# Patient Record
Sex: FEMALE | Race: WHITE | NOT HISPANIC OR LATINO | URBAN - METROPOLITAN AREA
[De-identification: names, ages, dates, MRNs, and addresses within clinical notes are randomized per-mention and may not be internally consistent; named-entity substitution may affect disease eponyms.]

---

## 2018-06-28 ENCOUNTER — TELEPHONE (OUTPATIENT)
Dept: OBSTETRICS AND GYNECOLOGY | Age: 53
End: 2018-06-28

## 2018-10-04 ENCOUNTER — OFFICE VISIT (OUTPATIENT)
Dept: OBSTETRICS AND GYNECOLOGY | Age: 53
End: 2018-10-04

## 2018-10-04 VITALS
BODY MASS INDEX: 23.7 KG/M2 | DIASTOLIC BLOOD PRESSURE: 82 MMHG | WEIGHT: 151 LBS | HEIGHT: 67 IN | SYSTOLIC BLOOD PRESSURE: 122 MMHG

## 2018-10-04 DIAGNOSIS — Z01.419 WELL WOMAN EXAM WITH ROUTINE GYNECOLOGICAL EXAM: Primary | ICD-10-CM

## 2018-10-04 DIAGNOSIS — Z00.00 ROUTINE GENERAL MEDICAL EXAMINATION AT A HEALTH CARE FACILITY: ICD-10-CM

## 2018-10-04 DIAGNOSIS — Z11.51 ENCOUNTER FOR SCREENING FOR HUMAN PAPILLOMAVIRUS (HPV): ICD-10-CM

## 2018-10-04 DIAGNOSIS — R87.619 ABNORMAL CYTOLOGICAL FINDING IN SPECIMEN FROM CERVIX UTERI: ICD-10-CM

## 2018-10-04 LAB
ALBUMIN SERPL-MCNC: 4.6 G/DL (ref 3.5–5.2)
ALBUMIN/GLOB SERPL: 1.8 G/DL
ALP SERPL-CCNC: 52 U/L (ref 39–117)
ALT SERPL-CCNC: 16 U/L (ref 1–33)
AST SERPL-CCNC: 18 U/L (ref 1–32)
BASOPHILS # BLD AUTO: 0.01 10*3/MM3 (ref 0–0.2)
BASOPHILS NFR BLD AUTO: 0.3 % (ref 0–1.5)
BILIRUB SERPL-MCNC: 0.5 MG/DL (ref 0.1–1.2)
BUN SERPL-MCNC: 16 MG/DL (ref 6–20)
BUN/CREAT SERPL: 21.1 (ref 7–25)
CALCIUM SERPL-MCNC: 9.8 MG/DL (ref 8.6–10.5)
CHLORIDE SERPL-SCNC: 100 MMOL/L (ref 98–107)
CHOLEST SERPL-MCNC: 234 MG/DL (ref 0–200)
CO2 SERPL-SCNC: 26.3 MMOL/L (ref 22–29)
CREAT SERPL-MCNC: 0.76 MG/DL (ref 0.57–1)
EOSINOPHIL # BLD AUTO: 0.11 10*3/MM3 (ref 0–0.7)
EOSINOPHIL NFR BLD AUTO: 3.7 % (ref 0.3–6.2)
ERYTHROCYTE [DISTWIDTH] IN BLOOD BY AUTOMATED COUNT: 12.8 % (ref 11.7–13)
GLOBULIN SER CALC-MCNC: 2.6 GM/DL
GLUCOSE SERPL-MCNC: 77 MG/DL (ref 65–99)
HCT VFR BLD AUTO: 43.6 % (ref 35.6–45.5)
HDLC SERPL-MCNC: 79 MG/DL (ref 40–60)
HGB BLD-MCNC: 14.3 G/DL (ref 11.9–15.5)
IMM GRANULOCYTES # BLD: 0 10*3/MM3 (ref 0–0.03)
IMM GRANULOCYTES NFR BLD: 0 % (ref 0–0.5)
LDLC SERPL CALC-MCNC: 132 MG/DL (ref 0–100)
LYMPHOCYTES # BLD AUTO: 1.12 10*3/MM3 (ref 0.9–4.8)
LYMPHOCYTES NFR BLD AUTO: 37.6 % (ref 19.6–45.3)
MCH RBC QN AUTO: 29.5 PG (ref 26.9–32)
MCHC RBC AUTO-ENTMCNC: 32.8 G/DL (ref 32.4–36.3)
MCV RBC AUTO: 90.1 FL (ref 80.5–98.2)
MONOCYTES # BLD AUTO: 0.48 10*3/MM3 (ref 0.2–1.2)
MONOCYTES NFR BLD AUTO: 16.1 % (ref 5–12)
NEUTROPHILS # BLD AUTO: 1.26 10*3/MM3 (ref 1.9–8.1)
NEUTROPHILS NFR BLD AUTO: 42.3 % (ref 42.7–76)
PLATELET # BLD AUTO: 205 10*3/MM3 (ref 140–500)
POTASSIUM SERPL-SCNC: 4.5 MMOL/L (ref 3.5–5.2)
PROT SERPL-MCNC: 7.2 G/DL (ref 6–8.5)
RBC # BLD AUTO: 4.84 10*6/MM3 (ref 3.9–5.2)
SODIUM SERPL-SCNC: 140 MMOL/L (ref 136–145)
TRIGL SERPL-MCNC: 116 MG/DL (ref 0–150)
TSH SERPL DL<=0.005 MIU/L-ACNC: 1.61 MIU/ML (ref 0.27–4.2)
VLDLC SERPL CALC-MCNC: 23.2 MG/DL (ref 5–40)
WBC # BLD AUTO: 2.98 10*3/MM3 (ref 4.5–10.7)

## 2018-10-04 PROCEDURE — G0101 CA SCREEN;PELVIC/BREAST EXAM: HCPCS | Performed by: OBSTETRICS & GYNECOLOGY

## 2018-10-04 RX ORDER — FEXOFENADINE HCL 180 MG/1
180 TABLET ORAL
COMMUNITY
End: 2021-10-12

## 2018-10-04 RX ORDER — PREDNISONE 1 MG/1
10 TABLET ORAL
COMMUNITY
End: 2020-03-02

## 2018-10-04 RX ORDER — SERTRALINE HYDROCHLORIDE 100 MG/1
100 TABLET, FILM COATED ORAL DAILY
Refills: 11 | COMMUNITY
Start: 2018-08-07 | End: 2020-03-02

## 2018-10-04 RX ORDER — FLUTICASONE PROPIONATE 50 MCG
1 SPRAY, SUSPENSION (ML) NASAL
COMMUNITY
Start: 2016-12-08

## 2018-10-04 RX ORDER — CYCLOSPORINE 0.5 MG/ML
EMULSION OPHTHALMIC
COMMUNITY
End: 2022-10-18

## 2018-10-04 RX ORDER — CELECOXIB 200 MG/1
CAPSULE ORAL
COMMUNITY
Start: 2017-01-13 | End: 2021-10-12

## 2018-10-04 RX ORDER — OMEPRAZOLE 20 MG/1
20 CAPSULE, DELAYED RELEASE ORAL
COMMUNITY

## 2018-10-04 RX ORDER — AZELASTINE HYDROCHLORIDE 0.5 MG/ML
SOLUTION/ DROPS OPHTHALMIC
Refills: 2 | COMMUNITY
Start: 2018-09-17 | End: 2021-10-12

## 2018-10-04 RX ORDER — ALBUTEROL SULFATE 90 UG/1
2 AEROSOL, METERED RESPIRATORY (INHALATION)
COMMUNITY
End: 2021-10-12

## 2018-10-04 NOTE — PROGRESS NOTES
Chief complaint: annual    Subjective   History of Present Illness    Viviane Pompa is a 53 y.o.  who presents for New Gyn/annual exam.  Menses are absent. Menopause age 50. Hot flashes have resolved. Declines hormones. H/o cryo in 20's.     Obstetric History:  OB History      Para Term  AB Living    1       1      SAB TAB Ectopic Molar Multiple Live Births    1                   Menstrual History:     No LMP recorded (lmp unknown).         Current contraception: post menopausal status  History of abnormal Pap smear: yes -  in 20's  Received Gardasil immunization: no  Perform regular self breast exam: yes -    Family history of uterine or ovarian cancer: no  Family History of colon cancer: no  Family history of breast cancer: yes - MGM dx age 48    Mammogram: ordered.  Colonoscopy: recommended.  DEXA: not indicated.    Exercise: moderately active  Calcium/Vitamin D: adequate intake    The following portions of the patient's history were reviewed and updated as appropriate: allergies, current medications, past family history, past medical history, past social history, past surgical history and problem list.    Review of Systems   Constitutional: Negative for activity change, fatigue, fever and unexpected weight change.   Respiratory: Negative for chest tightness and shortness of breath.    Cardiovascular: Negative for chest pain, palpitations and leg swelling.   Gastrointestinal: Negative for abdominal distention, abdominal pain, blood in stool, constipation, diarrhea, nausea and vomiting.   Endocrine: Negative for cold intolerance, heat intolerance, polydipsia, polyphagia and polyuria.   Genitourinary: Negative.    Musculoskeletal: Positive for arthralgias and joint swelling. Negative for back pain.   Skin: Negative for color change.   Neurological: Negative for weakness and headaches.   Hematological: Does not bruise/bleed easily.   Psychiatric/Behavioral: Negative for confusion.  "      Pertinent items are noted in HPI.     Objective   Physical Exam    /82   Ht 170.2 cm (67\")   Wt 68.5 kg (151 lb)   LMP  (LMP Unknown)   BMI 23.65 kg/m²     General:   alert, appears stated age and cooperative   Neck: no asymmetry, masses, or scars   Heart: regular rate and rhythm, S1, S2 normal, no murmur, click, rub or gallop   Lungs: clear to auscultation bilaterally   Abdomen: soft, non-tender, without masses or organomegaly   Breast: inspection negative, no nipple discharge or bleeding, no masses or nodularity palpable, bilateral implants   Vulva: normal, Bartholin's, Urethra, [place]'s normal   Vagina: normal mucosa   Cervix: no bleeding following Pap, no cervical motion tenderness, no lesions and nulliparous appearance   Uterus: normal size, anteverted, mobile, non-tender, normal shape and consistency   Adnexa: normal adnexa and no mass, fullness, tenderness   Rectal: not indicated     Assessment/Plan   Viviane was seen today for gynecologic exam.    Diagnoses and all orders for this visit:    Well woman exam with routine gynecological exam  -     PapIG, HPV, Rfx 16 / 18    Encounter for screening for human papillomavirus (HPV)  -     PapIG, HPV, Rfx 16 / 18    Routine general medical examination at a health care facility  -     CBC & Differential  -     Comprehensive Metabolic Panel  -     Lipid Panel  -     TSH    Abnormal cytological finding in specimen from cervix uteri   -     PapIG, HPV, Rfx 16 / 18    pt desires hereditary cancer gene panel w/ [facility], info given (MGM w/ breast CA age 47 and personal h/o colon cancer age 40)    Breast self exam technique reviewed and patient encouraged to perform self-exam monthly.  Discussed healthy lifestyle modifications.  Pap smear sent                 "

## 2018-10-09 LAB
CYTOLOGIST CVX/VAG CYTO: NORMAL
CYTOLOGY CVX/VAG DOC THIN PREP: NORMAL
DX ICD CODE: NORMAL
HIV 1 & 2 AB SER-IMP: NORMAL
HPV I/H RISK 1 DNA CVX QL PROBE+SIG AMP: NEGATIVE
Lab: NORMAL
OTHER STN SPEC: NORMAL
PATH REPORT.FINAL DX SPEC: NORMAL
STAT OF ADQ CVX/VAG CYTO-IMP: NORMAL

## 2018-10-10 ENCOUNTER — TELEPHONE (OUTPATIENT)
Dept: OBSTETRICS AND GYNECOLOGY | Age: 53
End: 2018-10-10

## 2018-12-26 ENCOUNTER — PROCEDURE VISIT (OUTPATIENT)
Dept: OBSTETRICS AND GYNECOLOGY | Age: 53
End: 2018-12-26

## 2018-12-26 ENCOUNTER — APPOINTMENT (OUTPATIENT)
Dept: WOMENS IMAGING | Facility: HOSPITAL | Age: 53
End: 2018-12-26

## 2018-12-26 DIAGNOSIS — Z12.31 VISIT FOR SCREENING MAMMOGRAM: Primary | ICD-10-CM

## 2018-12-26 PROCEDURE — 77067 SCR MAMMO BI INCL CAD: CPT | Performed by: RADIOLOGY

## 2018-12-26 PROCEDURE — 77067 SCR MAMMO BI INCL CAD: CPT | Performed by: OBSTETRICS & GYNECOLOGY

## 2020-03-02 ENCOUNTER — PROCEDURE VISIT (OUTPATIENT)
Dept: OBSTETRICS AND GYNECOLOGY | Age: 55
End: 2020-03-02

## 2020-03-02 ENCOUNTER — APPOINTMENT (OUTPATIENT)
Dept: WOMENS IMAGING | Facility: HOSPITAL | Age: 55
End: 2020-03-02

## 2020-03-02 ENCOUNTER — OFFICE VISIT (OUTPATIENT)
Dept: OBSTETRICS AND GYNECOLOGY | Age: 55
End: 2020-03-02

## 2020-03-02 VITALS
WEIGHT: 149.2 LBS | HEIGHT: 67 IN | BODY MASS INDEX: 23.42 KG/M2 | SYSTOLIC BLOOD PRESSURE: 108 MMHG | DIASTOLIC BLOOD PRESSURE: 64 MMHG

## 2020-03-02 DIAGNOSIS — N95.1 HOT FLASHES DUE TO MENOPAUSE: ICD-10-CM

## 2020-03-02 DIAGNOSIS — R53.82 CHRONIC FATIGUE: ICD-10-CM

## 2020-03-02 DIAGNOSIS — R10.2 PELVIC PAIN: ICD-10-CM

## 2020-03-02 DIAGNOSIS — Z01.419 WELL WOMAN EXAM WITH ROUTINE GYNECOLOGICAL EXAM: Primary | ICD-10-CM

## 2020-03-02 DIAGNOSIS — Z12.31 VISIT FOR SCREENING MAMMOGRAM: Primary | ICD-10-CM

## 2020-03-02 PROBLEM — Z85.038 HISTORY OF COLON CANCER: Status: ACTIVE | Noted: 2020-03-02

## 2020-03-02 PROBLEM — Z79.52 LONG TERM SYSTEMIC STEROID USER: Status: ACTIVE | Noted: 2020-03-02

## 2020-03-02 PROBLEM — Z13.71 BRCA NEGATIVE: Status: ACTIVE | Noted: 2020-03-02

## 2020-03-02 PROBLEM — M06.9 RA (RHEUMATOID ARTHRITIS) (HCC): Status: ACTIVE | Noted: 2020-03-02

## 2020-03-02 PROBLEM — N28.1 CYST OF RIGHT KIDNEY: Status: ACTIVE | Noted: 2020-03-02

## 2020-03-02 PROCEDURE — 77067 SCR MAMMO BI INCL CAD: CPT | Performed by: OBSTETRICS & GYNECOLOGY

## 2020-03-02 PROCEDURE — 77067 SCR MAMMO BI INCL CAD: CPT | Performed by: RADIOLOGY

## 2020-03-02 PROCEDURE — G0101 CA SCREEN;PELVIC/BREAST EXAM: HCPCS | Performed by: OBSTETRICS & GYNECOLOGY

## 2020-03-02 RX ORDER — ESTRADIOL 0.05 MG/D
1 PATCH TRANSDERMAL WEEKLY
Qty: 4 PATCH | Refills: 11 | Status: SHIPPED | OUTPATIENT
Start: 2020-03-02 | End: 2021-10-12

## 2020-03-02 NOTE — PROGRESS NOTES
Chief complaint: annual    Subjective   History of Present Illness    Viviane Pompa is a 54 y.o.  who presents for annual exam. C/o generalized fatigue since becoming menopausal. No PMB. Hasn't used hormones but she is interested in trying them. Some hot flashes.   Her menses are absent. Has RA and has been on steroids for years but is currently off them. Has a personal h/o colon cancer with a colon resection and has colonoscopy q5 yrs. H/o Hastings's esophagus (from NSAID use). Does have some pelvic pain.  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 Patient is postmenopausal.         Current contraception: post menopausal status  History of abnormal Pap smear: yes - cryo in 20s  Received Gardasil immunization: no  Perform regular self breast exam: yes -    Family history of uterine or ovarian cancer: no  Family History of colon cancer: yes - pt with colon cancer  Family history of breast cancer: yes - MGM, maternal aunt, paternal aunt    Mammogram: done today.  Colonoscopy: recommended.  DEXA: ordered.    Exercise: moderately active  Calcium/Vitamin D: adequate intake    The following portions of the patient's history were reviewed and updated as appropriate: allergies, current medications, past family history, past medical history, past social history, past surgical history and problem list.    Review of Systems   Constitutional: Positive for fatigue.   HENT: Negative.    Respiratory: Negative.    Cardiovascular: Negative.    Gastrointestinal: Negative.    Endocrine: Positive for heat intolerance.   Genitourinary: Positive for pelvic pain.  "  Musculoskeletal: Positive for arthralgias, back pain and joint swelling.   Skin: Negative.    Neurological: Negative.    Psychiatric/Behavioral: Negative.        Pertinent items are noted in HPI.     Objective   Physical Exam    /64   Ht 170.2 cm (67\")   Wt 67.7 kg (149 lb 3.2 oz)   LMP  (LMP Unknown)   Breastfeeding No   BMI 23.37 kg/m²     General:   alert, appears stated age and cooperative   Neck: no asymmetry, masses, or scars   Heart: regular rate and rhythm, S1, S2 normal, no murmur, click, rub or gallop   Lungs: clear to auscultation bilaterally   Abdomen: soft, non-tender, without masses or organomegaly   Breast: inspection negative, no nipple discharge or bleeding, no masses or nodularity palpable   Vulva: normal, Bartholin's, Urethra, [place]'s normal   Vagina: normal mucosa   Cervix: nabothian cyst, no cervical motion tenderness and nulliparous appearance   Uterus: normal size, anteverted   Adnexa: some left adnexal tenderness, left ovary felt, right adnexa negative   Rectal: not indicated     Assessment/Plan   Viviane was seen today for gynecologic exam.    Diagnoses and all orders for this visit:    Well woman exam with routine gynecological exam    Chronic fatigue  -     estradiol (CLIMARA) 0.05 MG/24HR patch; Place 1 patch on the skin as directed by provider 1 (One) Time Per Week.  -     progesterone (PROMETRIUM) 100 MG capsule; Take 1 capsule by mouth Daily.    Hot flashes due to menopause  -     estradiol (CLIMARA) 0.05 MG/24HR patch; Place 1 patch on the skin as directed by provider 1 (One) Time Per Week.  -     progesterone (PROMETRIUM) 100 MG capsule; Take 1 capsule by mouth Daily.    Pelvic pain    plan pelvic US    Breast self exam technique reviewed and patient encouraged to perform self-exam monthly.  Discussed healthy lifestyle modifications.  Pap smear up to date    Plan DEXA at f/u appt due to chronic steroid use for RA    Risks/benefits of hormones reviewed- risk of " DVT,PE,stroke, increased risk of breast cancer, benefits- improve bone density, tx of hot flashes and menopausal symptoms    Plan fasting labs at f/u visit- plan TSH, lipids, Vit D and Vit B12

## 2020-07-02 ENCOUNTER — OFFICE (AMBULATORY)
Dept: URBAN - METROPOLITAN AREA CLINIC 75 | Facility: CLINIC | Age: 55
End: 2020-07-02

## 2020-07-02 VITALS — HEIGHT: 67 IN | WEIGHT: 139 LBS | TEMPERATURE: 97.5 F

## 2020-07-02 DIAGNOSIS — Z86.010 PERSONAL HISTORY OF COLONIC POLYPS: ICD-10-CM

## 2020-07-02 DIAGNOSIS — R11.0 NAUSEA: ICD-10-CM

## 2020-07-02 DIAGNOSIS — R11.2 NAUSEA WITH VOMITING, UNSPECIFIED: ICD-10-CM

## 2020-07-02 DIAGNOSIS — K21.9 GASTRO-ESOPHAGEAL REFLUX DISEASE WITHOUT ESOPHAGITIS: ICD-10-CM

## 2020-07-02 DIAGNOSIS — R07.89 OTHER CHEST PAIN: ICD-10-CM

## 2020-07-02 DIAGNOSIS — R10.13 EPIGASTRIC PAIN: ICD-10-CM

## 2020-07-02 PROCEDURE — 99204 OFFICE O/P NEW MOD 45 MIN: CPT | Performed by: INTERNAL MEDICINE

## 2020-08-03 VITALS
RESPIRATION RATE: 20 BRPM | HEIGHT: 67 IN | DIASTOLIC BLOOD PRESSURE: 56 MMHG | TEMPERATURE: 96.4 F | SYSTOLIC BLOOD PRESSURE: 127 MMHG | HEART RATE: 77 BPM | DIASTOLIC BLOOD PRESSURE: 52 MMHG | HEART RATE: 91 BPM | DIASTOLIC BLOOD PRESSURE: 62 MMHG | DIASTOLIC BLOOD PRESSURE: 58 MMHG | SYSTOLIC BLOOD PRESSURE: 96 MMHG | DIASTOLIC BLOOD PRESSURE: 44 MMHG | SYSTOLIC BLOOD PRESSURE: 85 MMHG | RESPIRATION RATE: 10 BRPM | SYSTOLIC BLOOD PRESSURE: 103 MMHG | HEART RATE: 87 BPM | HEART RATE: 78 BPM | RESPIRATION RATE: 9 BRPM | HEART RATE: 84 BPM | DIASTOLIC BLOOD PRESSURE: 61 MMHG | RESPIRATION RATE: 16 BRPM | OXYGEN SATURATION: 100 % | DIASTOLIC BLOOD PRESSURE: 80 MMHG | HEART RATE: 81 BPM | WEIGHT: 135 LBS | OXYGEN SATURATION: 94 % | RESPIRATION RATE: 15 BRPM | RESPIRATION RATE: 18 BRPM | SYSTOLIC BLOOD PRESSURE: 94 MMHG | DIASTOLIC BLOOD PRESSURE: 57 MMHG | HEART RATE: 60 BPM | RESPIRATION RATE: 17 BRPM | OXYGEN SATURATION: 97 % | SYSTOLIC BLOOD PRESSURE: 106 MMHG | SYSTOLIC BLOOD PRESSURE: 98 MMHG | SYSTOLIC BLOOD PRESSURE: 100 MMHG | SYSTOLIC BLOOD PRESSURE: 113 MMHG | HEART RATE: 92 BPM | TEMPERATURE: 97.9 F | DIASTOLIC BLOOD PRESSURE: 96 MMHG | HEART RATE: 86 BPM | RESPIRATION RATE: 19 BRPM

## 2020-08-04 ENCOUNTER — OFFICE (AMBULATORY)
Dept: URBAN - METROPOLITAN AREA LAB 2 | Facility: LAB | Age: 55
End: 2020-08-04
Payer: MEDICARE

## 2020-08-04 DIAGNOSIS — Z11.59 ENCOUNTER FOR SCREENING FOR OTHER VIRAL DISEASES: ICD-10-CM

## 2020-08-04 PROCEDURE — U0002 COVID-19 LAB TEST NON-CDC: HCPCS | Performed by: INTERNAL MEDICINE

## 2020-08-04 PROCEDURE — 87633 RESP VIRUS 12-25 TARGETS: CPT | Performed by: INTERNAL MEDICINE

## 2020-08-07 ENCOUNTER — AMBULATORY SURGICAL CENTER (AMBULATORY)
Dept: URBAN - METROPOLITAN AREA SURGERY 17 | Facility: SURGERY | Age: 55
End: 2020-08-07
Payer: MEDICARE

## 2020-08-07 ENCOUNTER — OFFICE (AMBULATORY)
Dept: URBAN - METROPOLITAN AREA PATHOLOGY 4 | Facility: PATHOLOGY | Age: 55
End: 2020-08-07

## 2020-08-07 DIAGNOSIS — Z86.010 PERSONAL HISTORY OF COLONIC POLYPS: ICD-10-CM

## 2020-08-07 DIAGNOSIS — K29.50 UNSPECIFIED CHRONIC GASTRITIS WITHOUT BLEEDING: ICD-10-CM

## 2020-08-07 DIAGNOSIS — K21.0 GASTRO-ESOPHAGEAL REFLUX DISEASE WITH ESOPHAGITIS: ICD-10-CM

## 2020-08-07 DIAGNOSIS — K64.8 OTHER HEMORRHOIDS: ICD-10-CM

## 2020-08-07 DIAGNOSIS — Z98.890 OTHER SPECIFIED POSTPROCEDURAL STATES: ICD-10-CM

## 2020-08-07 DIAGNOSIS — R11.2 NAUSEA WITH VOMITING, UNSPECIFIED: ICD-10-CM

## 2020-08-07 DIAGNOSIS — R07.89 OTHER CHEST PAIN: ICD-10-CM

## 2020-08-07 DIAGNOSIS — R10.13 EPIGASTRIC PAIN: ICD-10-CM

## 2020-08-07 DIAGNOSIS — K31.89 OTHER DISEASES OF STOMACH AND DUODENUM: ICD-10-CM

## 2020-08-07 DIAGNOSIS — K31.7 POLYP OF STOMACH AND DUODENUM: ICD-10-CM

## 2020-08-07 PROBLEM — K22.8 OTHER SPECIFIED DISEASES OF ESOPHAGUS: Status: ACTIVE | Noted: 2020-08-07

## 2020-08-07 LAB
GI HISTOLOGY: A. SELECT: (no result)
GI HISTOLOGY: PDF REPORT: (no result)

## 2020-08-07 PROCEDURE — 88305 TISSUE EXAM BY PATHOLOGIST: CPT | Performed by: INTERNAL MEDICINE

## 2020-08-07 PROCEDURE — G0105 COLORECTAL SCRN; HI RISK IND: HCPCS | Performed by: INTERNAL MEDICINE

## 2020-08-07 PROCEDURE — 88342 IMHCHEM/IMCYTCHM 1ST ANTB: CPT | Performed by: INTERNAL MEDICINE

## 2020-08-07 PROCEDURE — 43239 EGD BIOPSY SINGLE/MULTIPLE: CPT | Mod: 59 | Performed by: INTERNAL MEDICINE

## 2020-08-07 NOTE — SERVICEHPINOTES
Thank you very much for referring Yahaira for evaluation seen her several years. She has a history of reflux and she says that she's stress, as a result she started having epigastric burning, she is having chest as well as nausea and vomiting. She increase her omeprazole to twice a day but still had symptoms. She started taking over-the-counter therapies. She has had pain, nausea and vomiting with or without food. Symptoms improved after being started on Carafate. There is no dysphagia, odynophagia melena or hematemesis. She's had intentional weight loss. She doesn't smoke. She is a social drinker. She does take Celebrex. She was on multiple supplements and felt better on supplements, I told her time to resume those. There has also been concern in the past about Smith's esophagus, I did an EGD on her 5 years ago, biopsies at that time were negative for intestinal metaplasia. 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 A diagnosed years ago with a large tubulovillous polyp requiring surgical resection. It's been 5 years since she had a colonoscopy. She does have alternating diarrhea and constipation which is not new. There is no change in her bowel habits. She has a family history of polyps. There is no blood in the bowels. She is in no distress. She does not look acutely ill.

## 2021-09-02 ENCOUNTER — TELEPHONE (OUTPATIENT)
Dept: OBSTETRICS AND GYNECOLOGY | Age: 56
End: 2021-09-02

## 2021-10-12 ENCOUNTER — APPOINTMENT (OUTPATIENT)
Dept: WOMENS IMAGING | Facility: HOSPITAL | Age: 56
End: 2021-10-12

## 2021-10-12 ENCOUNTER — PROCEDURE VISIT (OUTPATIENT)
Dept: OBSTETRICS AND GYNECOLOGY | Age: 56
End: 2021-10-12

## 2021-10-12 ENCOUNTER — OFFICE VISIT (OUTPATIENT)
Dept: OBSTETRICS AND GYNECOLOGY | Age: 56
End: 2021-10-12

## 2021-10-12 VITALS
DIASTOLIC BLOOD PRESSURE: 62 MMHG | HEIGHT: 67 IN | WEIGHT: 150.4 LBS | BODY MASS INDEX: 23.61 KG/M2 | SYSTOLIC BLOOD PRESSURE: 100 MMHG

## 2021-10-12 DIAGNOSIS — Z78.0 POST-MENOPAUSAL: Primary | ICD-10-CM

## 2021-10-12 DIAGNOSIS — Z01.419 ENCOUNTER FOR GYNECOLOGICAL EXAMINATION: Primary | ICD-10-CM

## 2021-10-12 DIAGNOSIS — Z12.31 VISIT FOR SCREENING MAMMOGRAM: Primary | ICD-10-CM

## 2021-10-12 DIAGNOSIS — Z11.51 ENCOUNTER FOR SCREENING FOR HUMAN PAPILLOMAVIRUS (HPV): ICD-10-CM

## 2021-10-12 PROCEDURE — G0101 CA SCREEN;PELVIC/BREAST EXAM: HCPCS | Performed by: OBSTETRICS & GYNECOLOGY

## 2021-10-12 PROCEDURE — 77063 BREAST TOMOSYNTHESIS BI: CPT | Performed by: RADIOLOGY

## 2021-10-12 PROCEDURE — 77080 DXA BONE DENSITY AXIAL: CPT | Performed by: OBSTETRICS & GYNECOLOGY

## 2021-10-12 PROCEDURE — 77067 SCR MAMMO BI INCL CAD: CPT | Performed by: RADIOLOGY

## 2021-10-12 PROCEDURE — 77063 BREAST TOMOSYNTHESIS BI: CPT | Performed by: OBSTETRICS & GYNECOLOGY

## 2021-10-12 PROCEDURE — 77067 SCR MAMMO BI INCL CAD: CPT | Performed by: OBSTETRICS & GYNECOLOGY

## 2021-10-12 RX ORDER — TOCILIZUMAB 180 MG/ML
162 INJECTION, SOLUTION SUBCUTANEOUS
COMMUNITY
Start: 2021-10-07

## 2021-10-12 NOTE — PROGRESS NOTES
.  Subjective     Chief Complaint   Patient presents with   • Gynecologic Exam     NP  AE LAST PAP 10/04/18-, HPV- (FLIP), MG 3/2/20       History of Present Illness    Viviane Pompa is a 56 y.o.  who presents for annual exam. She is a new pt to this MD, a former pt of Dr. Sam.  She denies any vaginal bleeding or gyn issues today.  She is on disability for multiple autoimmune diseases. She recently  her long term partner.     Obstetric History:  OB History        1    Para        Term                AB   1    Living           SAB   1    IAB        Ectopic        Molar        Multiple        Live Births                   Menstrual History:     No LMP recorded (lmp unknown). Patient is postmenopausal.         Current contraception: post menopausal status  History of abnormal Pap smear: yes - had cryo in her 20's  Received Gardasil immunization: no  Perform regular self breast exam: yes - reg  Family history of uterine or ovarian cancer: pt unsure  Family History of colon cancer: yes - patient  Family history of breast cancer: yes - mgm, mat aunt, pat aunt-pt had negative [facility]    Mammogram: scheduled today  Colonoscopy: pt reports she is up to date. She is followed by JASON Garcia MD, who manages her follow-up  DEXA: ordered.    Exercise: moderately active  Calcium/Vitamin D: adequate intake    The following portions of the patient's history were reviewed and updated as appropriate: allergies, current medications, past family history, past medical history, past social history, past surgical history and problem list.    Review of Systems    Review of Systems   Constitutional: Negative for fatigue.   Respiratory: Negative for shortness of breath.    Gastrointestinal: Negative for abdominal pain.   Genitourinary: Negative for vaginal bleeding or pelvic pain.   Neurological: Negative for headaches.   Psychiatric/Behavioral: Negative for dysphoric mood.         Objective   Physical  "Exam    /62   Ht 170.2 cm (67\")   Wt 68.2 kg (150 lb 6.4 oz)   LMP  (LMP Unknown)   Breastfeeding No   BMI 23.56 kg/m²   General:   alert and no distress   Heart: regular rate and rhythm   Lungs: clear to auscultation bilaterally   Breast: Inspection with healed scars from prior surgery; implants noted bilaterally;  no masses, retractions nipple discharge or axillary adenopathy   Neck: supple   Abdomen: soft, non-tender. No masses,  no organomegaly   Pelvis: External genitalia: normal general appearance  Urinary system: urethral meatus normal  Vaginal: atrophic mucosa without prolapse or lesions  Cervix: normal appearance  Adnexa: no masses or tenderness  Uterus: normal, nontender   Extremities: Extremities normal, atraumatic, no cyanosis or edema   Neurologic: Alert and oriented   Psychiatric: Normal affect, judgement, and mood     Assessment/Plan   Diagnoses and all orders for this visit:    1. Encounter for gynecological examination (Primary)  -     IGP, Apt HPV,rfx 16 / 18,45    2. Encounter for screening for human papillomavirus (HPV)  -     IGP, Apt HPV,rfx 16 / 18,45        All questions answered.  Breast self exam technique reviewed and patient encouraged to perform self-exam monthly.  Discussed healthy lifestyle modifications.  Recommended 30 minutes of aerobic exercise five times per week.  Discussed calcium needs to prevent osteoporosis.  Advised pt to call if she does not receive results of pap and mammogram within 2 weeks         "

## 2021-10-14 ENCOUNTER — TELEPHONE (OUTPATIENT)
Dept: OBSTETRICS AND GYNECOLOGY | Age: 56
End: 2021-10-14

## 2021-10-14 PROBLEM — M85.80 OSTEOPENIA: Status: ACTIVE | Noted: 2021-10-14

## 2021-10-14 NOTE — TELEPHONE ENCOUNTER
Called pt to discuss bone density results. No answer. Left message to call office to review with MA.     Forwarded message to MA with results. Recommended she have pt review these results with her rheumatologist.

## 2021-10-15 LAB
CYTOLOGIST CVX/VAG CYTO: NORMAL
CYTOLOGY CVX/VAG DOC CYTO: NORMAL
CYTOLOGY CVX/VAG DOC THIN PREP: NORMAL
DX ICD CODE: NORMAL
HIV 1 & 2 AB SER-IMP: NORMAL
HPV I/H RISK 4 DNA CVX QL PROBE+SIG AMP: NEGATIVE
Lab: NORMAL
OTHER STN SPEC: NORMAL
STAT OF ADQ CVX/VAG CYTO-IMP: NORMAL

## 2022-10-18 ENCOUNTER — APPOINTMENT (OUTPATIENT)
Dept: WOMENS IMAGING | Facility: HOSPITAL | Age: 57
End: 2022-10-18

## 2022-10-18 ENCOUNTER — OFFICE VISIT (OUTPATIENT)
Dept: OBSTETRICS AND GYNECOLOGY | Age: 57
End: 2022-10-18

## 2022-10-18 ENCOUNTER — PROCEDURE VISIT (OUTPATIENT)
Dept: OBSTETRICS AND GYNECOLOGY | Age: 57
End: 2022-10-18

## 2022-10-18 VITALS
DIASTOLIC BLOOD PRESSURE: 60 MMHG | SYSTOLIC BLOOD PRESSURE: 102 MMHG | WEIGHT: 145.4 LBS | BODY MASS INDEX: 22.82 KG/M2 | HEIGHT: 67 IN

## 2022-10-18 DIAGNOSIS — Z12.31 VISIT FOR SCREENING MAMMOGRAM: Primary | ICD-10-CM

## 2022-10-18 DIAGNOSIS — Z11.51 ENCOUNTER FOR SCREENING FOR HUMAN PAPILLOMAVIRUS (HPV): ICD-10-CM

## 2022-10-18 DIAGNOSIS — Z01.419 ENCOUNTER FOR GYNECOLOGICAL EXAMINATION: Primary | ICD-10-CM

## 2022-10-18 DIAGNOSIS — N84.1 CERVICAL POLYP: ICD-10-CM

## 2022-10-18 PROBLEM — I73.00 RAYNAUD'S DISEASE: Status: ACTIVE | Noted: 2022-04-18

## 2022-10-18 PROBLEM — M19.079 PRIMARY LOCALIZED OSTEOARTHROSIS OF ANKLE AND FOOT: Status: ACTIVE | Noted: 2022-04-18

## 2022-10-18 PROBLEM — F41.9 ANXIETY: Status: ACTIVE | Noted: 2022-04-18

## 2022-10-18 PROBLEM — M35.00 SJOGREN'S SYNDROME: Status: ACTIVE | Noted: 2022-10-18

## 2022-10-18 PROBLEM — E78.2 MIXED HYPERLIPIDEMIA: Status: ACTIVE | Noted: 2022-04-18

## 2022-10-18 PROBLEM — H25.13 AGE-RELATED NUCLEAR CATARACT OF BOTH EYES: Status: ACTIVE | Noted: 2018-02-02

## 2022-10-18 PROBLEM — E03.9 ACQUIRED HYPOTHYROIDISM: Status: ACTIVE | Noted: 2017-01-01

## 2022-10-18 PROCEDURE — 99396 PREV VISIT EST AGE 40-64: CPT | Performed by: OBSTETRICS & GYNECOLOGY

## 2022-10-18 PROCEDURE — 77063 BREAST TOMOSYNTHESIS BI: CPT | Performed by: RADIOLOGY

## 2022-10-18 PROCEDURE — 77067 SCR MAMMO BI INCL CAD: CPT | Performed by: OBSTETRICS & GYNECOLOGY

## 2022-10-18 PROCEDURE — 77063 BREAST TOMOSYNTHESIS BI: CPT | Performed by: OBSTETRICS & GYNECOLOGY

## 2022-10-18 PROCEDURE — 57500 BIOPSY OF CERVIX: CPT | Performed by: OBSTETRICS & GYNECOLOGY

## 2022-10-18 PROCEDURE — 77067 SCR MAMMO BI INCL CAD: CPT | Performed by: RADIOLOGY

## 2022-10-18 NOTE — PROGRESS NOTES
.  Subjective     Chief Complaint   Patient presents with   • Gynecologic Exam     Annual exam, Last Pap 10/12/2021 NEG, HPV NEG, Mammogram today, Last DEXA 10/14/2021, Pt has no complaints today        History of Present Illness    Viviane Pompa is a 57 y.o.  who presents for annual exam.    She has been stressed lately. Her mother has required additional care and her spouse had very concerning CXR with possible cancer.     She denies any gyn issues today.     Obstetric History:  OB History        1    Para        Term                AB   1    Living           SAB   1    IAB        Ectopic        Molar        Multiple        Live Births                   Menstrual History:     No LMP recorded (lmp unknown). Patient is postmenopausal.         Current contraception: post menopausal status  History of abnormal Pap smear: yes - had cryo in her 20's  Received Gardasil immunization: no  Perform regular self breast exam: yes  Family history of uterine or ovarian cancer: no  Family History of colon cancer: yes - self  Family history of breast cancer: yes, mat GM and pat aunts    Mammogram: done today.  Colonoscopy: pt reports she is up to date, managed by Dr. Orr  DEXA: up to date, done --calculated Frax and treatment not indicated    Exercise: moderately active  Calcium/Vitamin D: adequate intake    The following portions of the patient's history were reviewed and updated as appropriate: allergies, current medications, past family history, past medical history, past social history, past surgical history and problem list.    Review of Systems    Review of Systems   Constitutional: Negative for fatigue.   Respiratory: Negative for shortness of breath.    Gastrointestinal: Negative for abdominal pain.   Genitourinary: Negative for vag bleeding or pelvic pain  Neurological: Negative for headaches.   Psychiatric/Behavioral: Negative for dysphoric mood.         Objective   Physical Exam    /60   " Ht 170.2 cm (67\")   Wt 66 kg (145 lb 6.4 oz)   LMP  (LMP Unknown)   BMI 22.77 kg/m²   General:   Alert, in no distress   Heart: regular rate and rhythm   Lungs: clear to auscultation bilaterally   Breast: Inspection negative; no masses, retractions, nipple discharge or axillary adenopathy   Neck: Supple   Abdomen: Soft, no tenderness or guarding   Pelvis: External genitalia: normal general appearance  Urinary system: urethral meatus normal  Vaginal: normal mucosa without prolapse or lesions  Cervix: friable lesion near os removed with martin clamp, otherwise no lesions  Adnexa: no masses or tenderness  Uterus: normal, nontender   Extremities: Normal without edema   Neurologic: Alert and oriented   Psychiatric: Normal affect, judgment and mood     Assessment & Plan   Diagnoses and all orders for this visit:    1. Encounter for gynecological examination (Primary)  -     IGP, Apt HPV,rfx 16 / 18,45    2. Encounter for screening for human papillomavirus (HPV)  -     IGP, Apt HPV,rfx 16 / 18,45    3. Cervical polyp  -     Reference Histopathology        All questions answered.  Breast self exam technique reviewed and patient encouraged to perform self-exam monthly.  Discussed healthy lifestyle modifications.  Recommended 30 minutes of aerobic exercise five times per week.  Discussed calcium needs to prevent osteoporosis.  Recommend pt call if she does not receive results of pap, polyp path and mammogram within 2 weeks      Procedure:   preop dx: cervical polyp  Postop dx: same  Procedure: removal of cervical poly  Following verbal consent, polyp grasped with ring forceps but not removed. Polyp then grasped with martin clamp and removed. Base hemostatic with pressure. Tissue sent for pathologic evaluation. Pt tolerated well. Post-biopsy instructions reviewed.      "

## 2022-10-20 LAB
DX ICD CODE: NORMAL
DX ICD CODE: NORMAL
PATH REPORT.FINAL DX SPEC: NORMAL
PATH REPORT.GROSS SPEC: NORMAL
PATH REPORT.SITE OF ORIGIN SPEC: NORMAL
PATHOLOGIST NAME: NORMAL
PAYMENT PROCEDURE: NORMAL

## 2022-10-24 LAB
CYTOLOGIST CVX/VAG CYTO: NORMAL
CYTOLOGY CVX/VAG DOC CYTO: NORMAL
CYTOLOGY CVX/VAG DOC THIN PREP: NORMAL
DX ICD CODE: NORMAL
HIV 1 & 2 AB SER-IMP: NORMAL
HPV I/H RISK 4 DNA CVX QL PROBE+SIG AMP: NEGATIVE
OTHER STN SPEC: NORMAL
STAT OF ADQ CVX/VAG CYTO-IMP: NORMAL

## 2022-11-08 ENCOUNTER — PROCEDURE VISIT (OUTPATIENT)
Dept: OBSTETRICS AND GYNECOLOGY | Age: 57
End: 2022-11-08

## 2022-11-08 VITALS
BODY MASS INDEX: 23.7 KG/M2 | HEIGHT: 67 IN | DIASTOLIC BLOOD PRESSURE: 64 MMHG | WEIGHT: 151 LBS | SYSTOLIC BLOOD PRESSURE: 102 MMHG

## 2022-11-08 DIAGNOSIS — N88.9 CERVICAL LESION: Primary | ICD-10-CM

## 2022-11-08 PROCEDURE — 57455 BIOPSY OF CERVIX W/SCOPE: CPT | Performed by: OBSTETRICS & GYNECOLOGY

## 2022-11-08 NOTE — PROGRESS NOTES
Procedure   Procedures       Physical Exam  Genitourinary:           Comments: Tissue prominence noted in red and removed with biopsy forceps        Colposcopy Procedure Note    Indications: Pap smear 1 months ago showed: no abnormalities.she had a small lesion within the cervical os that was removed at annual. Pathology with mucous only. Pt scheduled for colposcopy to further evaluate endocervix.      Procedure Details   The risks and benefits of the procedure and verbal and written informed consent obtained.    Speculum placed in vagina and excellent visualization of cervix achieved, cervix swabbed x 3 with acetic acid solution.    Findings:  Cervix: acetowhite lesion(s) noted at 9 o'clock; cervical biopsies taken at 9 o'clock, specimen labelled and sent to pathology and hemostasis achieved with Monsel's solution.      Specimens: cervical biopsy    Complications: none.    Patient tolerated the procedure well without complications.    Plan:  Specimens labelled and sent to Pathology.  Post biopsy instructions reviewed with pt. Advised her to call if she does not receive results.        11/8/2022  Terrie Biswas MD

## 2022-11-10 LAB
DX ICD CODE: NORMAL
DX ICD CODE: NORMAL
PATH REPORT.FINAL DX SPEC: NORMAL
PATH REPORT.GROSS SPEC: NORMAL
PATH REPORT.RELEVANT HX SPEC: NORMAL
PATH REPORT.SITE OF ORIGIN SPEC: NORMAL
PATHOLOGIST NAME: NORMAL
PAYMENT PROCEDURE: NORMAL

## 2022-11-15 PROBLEM — N87.0 DYSPLASIA OF CERVIX, LOW GRADE (CIN 1): Status: ACTIVE | Noted: 2022-11-15

## 2023-01-06 ENCOUNTER — TELEPHONE (OUTPATIENT)
Dept: OBSTETRICS AND GYNECOLOGY | Age: 58
End: 2023-01-06

## 2023-01-06 NOTE — TELEPHONE ENCOUNTER
Caller: MIO MCBRIDE    Relationship: SELF    Best call back number: [phone]    What does billing need from the patient:     MEDICARE DID NOT PAY FOR ANNUAL EXAM IN OCT 2022-PT EXPLAINED THAT DUE TO MEDICAL AND FAMILY HISTORY SHE HAS TO HAVE A PAP ANNUALLY-PREV DOCTOR HAD TO CODE DIFFERENTLY FOR THIS TO BE COVERED.    PLEASE CONTACT PT TO DISCUSS

## 2023-05-16 NOTE — TELEPHONE ENCOUNTER
Please check with billing to see if diagnosis of cervical polyp can be added to pap indication. I reviewed her prior visits and same code used for pap in past.

## 2023-05-19 NOTE — TELEPHONE ENCOUNTER
Cervical Polyp diagnosis can be added to pap indication, Please addendum this and send to Willa Novoa (Billing)

## 2023-05-29 PROBLEM — Z87.410 HISTORY OF CERVICAL DYSPLASIA: Status: ACTIVE | Noted: 2022-11-15

## 2023-06-16 ENCOUNTER — TELEPHONE (OUTPATIENT)
Dept: OBSTETRICS AND GYNECOLOGY | Age: 58
End: 2023-06-16
Payer: MEDICARE

## 2023-06-16 NOTE — TELEPHONE ENCOUNTER
Caller: RADHA HAMMER    Relationship to patient: Other    Best call back number: [phone]     is needing: RADHA ALONSO/ STEVE    IS TRYING TO REACH WAYNE OR PO  RE: MIO MCBRIDE    OKAY TO CALL ANY TIME  OKAY TO [facility]

## 2023-08-04 DIAGNOSIS — Z12.31 ENCOUNTER FOR SCREENING MAMMOGRAM FOR MALIGNANT NEOPLASM OF BREAST: Primary | ICD-10-CM

## 2025-07-17 ENCOUNTER — HOSPITAL ENCOUNTER (OUTPATIENT)
Dept: PHYSICAL THERAPY | Facility: HOSPITAL | Age: 60
Setting detail: THERAPIES SERIES
Discharge: HOME | End: 2025-07-17
Payer: MEDICARE

## 2025-07-17 DIAGNOSIS — M54.50 LOW BACK PAIN, UNSPECIFIED BACK PAIN LATERALITY, UNSPECIFIED CHRONICITY, UNSPECIFIED WHETHER SCIATICA PRESENT: Primary | ICD-10-CM

## 2025-07-17 DIAGNOSIS — Z74.09 IMPAIRED MOBILITY: ICD-10-CM

## 2025-07-17 PROCEDURE — 97161 PT EVAL LOW COMPLEX 20 MIN: CPT | Performed by: PHYSICAL THERAPIST

## 2025-07-17 NOTE — THERAPY EVALUATION
Outpatient Physical Therapy Ortho Initial Evaluation  [facility]     Patient Name: Viviane Pompa  : 1965  MRN: [id number]  Today's Date: 2025      Visit Date: 2025    Patient Active Problem List   Diagnosis    RA (rheumatoid arthritis)    History of colon cancer    Cyst of right kidney    Long term systemic steroid user    BRCA negative    Osteopenia    Acquired hypothyroidism    Age-related nuclear cataract of both eyes    Anxiety    Blepharitis    Corneal ulcer    Mixed hyperlipidemia    Nuclear senile cataract    Primary localized osteoarthrosis of ankle and foot    Raynaud's disease    Tear film insufficiency    Sjogren's syndrome    History of cervical dysplasia        Past Medical History:   Diagnosis Date    Abnormal Pap smear of cervix     cryotherapy in 20's    Hastings's esophagus determined by endoscopy     Cancer     colon cancer age 40    Colorectal cancer     age 40    Cyst of right kidney     RA (rheumatoid arthritis)     dx age 16    Trauma     arm fractures        Past Surgical History:   Procedure Laterality Date    BREAST AUGMENTATION      CERVIX LESION DESTRUCTION      COLON RESECTION      D & C WITH SUCTION      HAND SURGERY      for RA    REPLACEMENT TOTAL KNEE Right     for RA       Visit Dx:     ICD-10-CM ICD-9-CM   1. Low back pain, unspecified back pain laterality, unspecified chronicity, unspecified whether sciatica present  M54.50 724.2   2. Impaired mobility  Z74.09 799.89          Patient History       Row Name 25 1100             History    Chief Complaint Difficulty with daily activities;Pain  -GJ      Type of Pain Back pain  -GJ      Date Current Problem(s) Began 25  -GJ      Brief Description of Current Complaint  is a 58 y/o female. My whole spine is an issue. PMHx L3/4 discecotmy 2 year ago.  She also reports PPMhx of RA. She reports L5/S1 bulging disc. She reports 1 month hx of current condition.  She reports standing at sink,  needing to use restroom, turned, and felt pain immediately.  Pain location is L sided LBP, buttock, with numbness along anterior thigh to the knee. (Of note, LLE was involved piror to previous surgery, but did resolve). Her condition is unchanging. Pain is constant. Aggravating activities include bending, otherwise unable to describe aggravating activities. Of note, walking/standing does not bother her.Relieving activities include laying flat. (+)  N/T LLE/anterior thigh to the knee. Denies change in bowel/bladder habits. Denies Sleep disturbance secondary to LBP (medicated at baseline). MRI, indicates L5/S1 bulging disc per pt. Previous treatments for this condition include epidural x 1 (due for another steroid tomorrow 7/18/2025), oral steroids, gabapentin.  -GJ      Previous treatment for THIS PROBLEM Injections;Medication  -GJ      Patient/Caregiver Goals Relieve pain;Improve mobility;Improve strength;Know what to do to help the symptoms  -GJ      Hand Dominance right-handed  -GJ      Occupation/sports/leisure activities disability  -GJ      What clinical tests have you had for this problem? X-ray;MRI  -GJ      Results of Clinical Tests L5S1 bulge  -GJ      Are you or can you be pregnant No  -GJ         Pain     Pain Location Back  -GJ      What Performance Factors Make the Current Problem(s) WORSE? sitting/unsure  -GJ      What Performance Factors Make the Current Problem(s) BETTER? laying flat  -GJ         Fall Risk Assessment    Any falls in the past year: No  -GJ         Daily Activities    Primary Language English  -GJ      Are you able to read Yes  -GJ      Are you able to write Yes  -GJ      How does patient learn best? Demonstration;Pictures/Video;Reading;Listening  -GJ      Teaching needs identified Home Exercise Program;Management of Condition  -GJ      Patient is concerned about/has problems with Flexibility;Sitting  -GJ      Barriers to learning None  -GJ      Pt Participated in POC and Goals Yes   -GJ                User Key  (r) = Recorded By, (t) = Taken By, (c) = Cosigned By      Initials Name Provider Type    GJ Satya Fuentes, PT Physical Therapist                     PT Ortho       Row Name 07/17/25 1100       Posture/Observations    Alignment Options Forward head;Lumbar lordosis;Iliac crests;Genu valgus  -GJ    Forward Head Mild  -GJ    Lumbar lordosis Decreased  -GJ    Iliac crests Right:;Elevated  -GJ    Genu valgus Left:;Mild;Moderate  -GJ       Quarter Clearing    Quarter Clearing Lower Quarter Clearing  -GJ       DTR- Lower Quarter Clearing    Patellar tendon (L2-4) Bilateral:;0- No response  -GJ    Achilles tendon (S1-2) Bilateral:;0- No response  -GJ       Neural Tension Signs- Lower Quarter Clearing    Slump Bilateral:;Negative  -GJ    SLR Bilateral:;Negative  -GJ    Prone knee flexion Bilateral:;Negative  -GJ       Myotomal Screen- Lower Quarter Clearing    Hip flexion (L2) 4- (Good -)  noted lateral trunk flexion/posterior lean indicating decreased core strength  -GJ    Knee extension (L3) Bilateral:;5 (Normal)  -GJ    Ankle DF (L4) Bilateral:;5 (Normal)  -GJ    Great toe extension (L5) Right:;4 (Good);Left:;5 (Normal)  -GJ    Ankle PF (S1) Bilateral:;4 (Good)  -GJ    Knee flexion (S2) Bilateral:;5 (Normal)  -GJ       Lumbar ROM Screen- Lower Quarter Clearing    Lumbar Flexion Normal  -GJ    Lumbar Extension Normal  -GJ    Lumbar Lateral Flexion Normal  -GJ    Lumbar Rotation Normal  -GJ       SI/Hip Screen- Lower Quarter Clearing    ASIS compression Bilateral:;Negative  -GJ    ASIS distraction Bilateral:;Negative  -GJ    Jorgito's/Yaya's test Right:  was painful, however symetric ROM with L  -GJ    Posterior thigh sheer Bilateral:;Negative  -GJ    Pain in Ronald's area Bilateral:;Negative  -GJ       Special Tests/Palpation    Special Tests/Palpation Lumbar/SI;Hip  -GJ       Lumbar/SI Special Tests    Trendelenburg Test (Gluteus Medius Weakness) Bilateral:;Negative  -GJ    Thigh  Thrust/Posterior Shear (SI Dysfunction) Bilateral:;Negative  -GJ    Sacral Spring Test (SI Dysfunction) Negative  -GJ       Lumbosacral Palpation    Lumbosacral Palpation? Yes  -GJ    Piriformis Left:;Tender;Guarded/taut;Trigger point  -GJ    Erector Spinae (Paraspinals) Bilateral:;Guarded/taut  -GJ       Hip/Thigh Palpation    Hip/Thigh Palpation? Yes  -GJ    Gluteus Medius Left:;Tender;Guarded/taut;Trigger point  -GJ       Hip Special Tests    Ely’s test (rectus femoris tightness) Bilateral:;Positive  -GJ    Hip scour test (labral vs hip pathology) Bilateral:;Negative  -GJ       General ROM    GENERAL ROM COMMENTS Grossly noted bilateral hip, knee, ankle active range of motion symmetric and within functional limits  -GJ       MMT (Manual Muscle Testing)    Rt Lower Ext Rt Hip Extension;Rt Hip ABduction  -GJ    Lt Lower Ext Lt Hip Extension;Lt Hip ABduction  -GJ       MMT Right Lower Ext    Rt Hip ABduction MMT, Gross Movement (4+/5) good plus  -GJ       MMT Left Lower Ext    Lt Hip ABduction MMT, Gross Movement (4+/5) good plus  -GJ       Flexibility    Flexibility Tested? Lower Extremity  -GJ       Lower Extremity Flexibility    Hamstrings Bilateral:;WNL  -GJ    Hip Flexors Bilateral:;Mildly limited  -GJ    Hip External Rotators Bilateral:;Mildly limited  -GJ    Hip Internal Rotators Bilateral:;Mildly limited  -GJ       Balance Skills Training    SLS 5+ seconds bilaterally with appropriate ankle strategy  -GJ       Gait/Stairs (Locomotion)    Comment, (Gait/Stairs) No obvious gait deficits.  No AD  -GJ              User Key  (r) = Recorded By, (t) = Taken By, (c) = Cosigned By      Initials Name Provider Type    GJ Satya Fuentes, PT Physical Therapist                                Therapy Education  Education Details: [id number], discussed dx, px, poc, discussed anatomy of the spine and physiology of healing, discussed realistic expectations and time frames for therapy. Discussed activity modification.  Reviewed  indications for and risk benefits of dry needling.  Discussed ergonomics and postural considerations regarding her condition  Given: HEP, Symptoms/condition management, Pain management, Posture/body mechanics, Mobility training, Edema management  Program: New  How Provided: Verbal, Demonstration, Written  Provided to: Patient  Level of Understanding: Teach back education performed, Verbalized, Demonstrated      PT OP Goals       Row Name 07/17/25 1100          PT Short Term Goals    STG Date to Achieve 08/16/25  -GJ     STG 1 pt. to be I with initial HEP to facilitate self management of their condition  -GJ     STG 1 Progress New  -GJ     STG 2 pt. to be educated in/verbalize understanding of the importance of posture/ergonomics in association with their condition to facilitate self management of their condition  -GJ     STG 2 Progress New  -GJ     STG 3 Patient report left-sided low back pain to be intermittent versus constant to facilitate ease of performing household and community activities  -GJ     STG 3 Progress New  -GJ        Long Term Goals    LTG Date to Achieve 10/15/25  -GJ     LTG 1 pt. to be I with advanced HEP to facilitate self management of their condition  -GJ     LTG 1 Progress New  -GJ     LTG 2 pt. to report an LEFS >/=  70% to demonstrate decreased level of perceived disability  -GJ     LTG 2 Progress New  -GJ     LTG 3 Patient did not demonstrate appropriate ergonomics with lifting to facilitate ease of performance of household and community activities  -GJ     LTG 3 Progress New  -GJ     LTG 4 Report greater than equal to 25% improvement in her left-sided low back pain facilitate ease of performing household and recreational activities  -GJ     LTG 4 Progress New  -GJ        Time Calculation    PT Goal Re-Cert Due Date 10/15/25  -GJ               User Key  (r) = Recorded By, (t) = Taken By, (c) = Cosigned By      Initials Name Provider Type    Satya Ibarra, PT Physical Therapist                      PT Assessment/Plan       Row Name 07/17/25 1315          PT Assessment    Functional Limitations Limitation in home management;Limitations in community activities;Performance in leisure activities  -GJ     Impairments Impaired flexibility;Impaired muscle endurance;Impaired muscle length;Impaired muscle power;Impaired postural alignment;Muscle strength;Pain;Poor body mechanics;Posture;Range of motion  -GJ     Assessment Comments Ms. Pompa is a 59-year-old female.  She reports whole spine issues.  PMHx L3/4 discectomy 2 years ago.  She reports that she now has an L5/S1 disc bulge.  She reports approximate 1 month history of left-sided low back pain to include buttock pain with numbness and tingling along the anterior aspect of the left thigh to the knee.  (Of note left lower extremity is involved prior to her previous surgery 2 years ago, however did resolve).  Her condition is unchanging.  Pain is constant.  Aggravating activities include bending/otherwise unable to describe any aggravating activities.  She does note that walking and standing do not aggravate her condition.  Relieving activities include laying flat.  Positive numbness tingling in the left lower extremity/anterior thigh to the knee.  Denies change in bowel bladder habits.  Denies sleep disturbance secondary to low back pain.  Previous treatments for this condition include epidural x 1 (due for another epidural injection tomorrow, 7/18/2025), oral steroids, and gabapentin.  She is on disability secondary to RA.  She is enjoys boating. Ms. Cosby presents to the clinic close no obvious gait deficits.  She does demonstrate left genu valgus which may lead to presentation of right elevated iliac crest.  She demonstrates negative neural tension testing.  She demonstrates symmetrical and strong myotomal testing except for right great toe extension weaker than left.  She demonstrates positive taut bands of tissue along her left posterior  lateral hip girdle tissue.  She demonstrates negative special testing for SIJ.  She demonstrates decreased flexibility bilateral hip flexors.  She demonstrates generalized decreased hip girdle and core strength.  She reports that she is not typically an exerciser. She reports an LEFS score of 47%, scored 0-100, 100% represents no perceived disability.  Ms. Pompa  demonstrates evolving s/s consistent with degenerative change of her spine which limits her participation in hospital, community, recreational activities.    Aggravating/Personal factors affecting recovery include,  but are not limited to, RA, activity level.   may benefit from skilled physical therapy to address the above impairments.  -     Rehab Potential Excellent  -GJ     Patient/caregiver participated in establishment of treatment plan and goals Yes  -GJ     Patient would benefit from skilled therapy intervention Yes  -GJ        PT Plan    PT Frequency 1x/week;2x/week  -GJ     Predicted Duration of Therapy Intervention (PT) 10 visits  -     Planned CPT's? PT EVAL MOD COMPLELITY: 62102;PT RE-EVAL: 62674;PT THER PROC EA 15 MIN: 08370;PT THER ACT EA 15 MIN: 20427;PT MANUAL THERAPY EA 15 MIN: 72319;PT NEUROMUSC RE-EDUCATION EA 15 MIN: 33624;PT GAIT TRAINING EA 15 MIN: 97425;PT HOT OR COLD PACK TREAT MCARE;PT ELECTRICAL STIM UNATTEND:   -     PT Plan Comments warm up on eliptical vs. Nustep, ? STM to L posterior/lateral hip girdle tissue vs. DDN.  HL TA contraction, PPT, side-lying clam, bridge, piriformis stretch, hamstring 9090 with dorsi flexion.  May consider lateral bias child  -               User Key  (r) = Recorded By, (t) = Taken By, (c) = Cosigned By      Initials Name Provider Type     Satya Fuentes, PT Physical Therapist                       OP Exercises       Row Name 07/17/25 1200             Exercise 1    Exercise Name 1 ? elipitical vs. nustep  -      Additional Comments next session  -         Exercise 2     Exercise Name 2 LTR  -GJ      Cueing 2 Verbal;Demo  -GJ      Sets 2 5s  -GJ         Exercise 3    Exercise Name 3 piriformis stretch  -GJ      Cueing 3 Verbal;Demo  -GJ      Reps 3 3  -GJ      Time 3 20s  -GJ                User Key  (r) = Recorded By, (t) = Taken By, (c) = Cosigned By      Initials Name Provider Type     Satya Fuentes, PT Physical Therapist                                            Time Calculation:     Start Time: 1138 (eval appt time 1130)  Stop Time: 1215  Time Calculation (min): 37 min     Therapy Charges for Today       Code Description Service Date Service Provider Modifiers Qty    83735856082  PT EVAL LOW COMPLEXITY 4 7/17/2025 Satya Fuentes, PT GP 1            PT G-Codes  Total: (Patient-Rptd) (P) 38         Satya Fuentes, PT  7/17/2025

## 2025-07-30 ENCOUNTER — HOSPITAL ENCOUNTER (OUTPATIENT)
Dept: PHYSICAL THERAPY | Facility: HOSPITAL | Age: 60
Setting detail: THERAPIES SERIES
Discharge: HOME | End: 2025-07-30
Payer: MEDICARE

## 2025-07-30 DIAGNOSIS — M54.50 LOW BACK PAIN, UNSPECIFIED BACK PAIN LATERALITY, UNSPECIFIED CHRONICITY, UNSPECIFIED WHETHER SCIATICA PRESENT: Primary | ICD-10-CM

## 2025-07-30 DIAGNOSIS — Z74.09 IMPAIRED MOBILITY: ICD-10-CM

## 2025-07-30 PROCEDURE — 97140 MANUAL THERAPY 1/> REGIONS: CPT

## 2025-07-30 PROCEDURE — 97110 THERAPEUTIC EXERCISES: CPT

## 2025-07-30 NOTE — THERAPY TREATMENT NOTE
Outpatient Physical Therapy Ortho Treatment Note  [facility]     Patient Name: Viviane Pompa  : 1965  MRN: [id number]  Today's Date: 2025      Visit Date: 2025    Visit Dx:    ICD-10-CM ICD-9-CM   1. Low back pain, unspecified back pain laterality, unspecified chronicity, unspecified whether sciatica present  M54.50 724.2   2. Impaired mobility  Z74.09 799.89       Patient Active Problem List   Diagnosis    RA (rheumatoid arthritis)    History of colon cancer    Cyst of right kidney    Long term systemic steroid user    BRCA negative    Osteopenia    Acquired hypothyroidism    Age-related nuclear cataract of both eyes    Anxiety    Blepharitis    Corneal ulcer    Mixed hyperlipidemia    Nuclear senile cataract    Primary localized osteoarthrosis of ankle and foot    Raynaud's disease    Tear film insufficiency    Sjogren's syndrome    History of cervical dysplasia        Past Medical History:   Diagnosis Date    Abnormal Pap smear of cervix     cryotherapy in 20's    Hastings's esophagus determined by endoscopy     Cancer     colon cancer age 40    Colorectal cancer     age 40    Cyst of right kidney     RA (rheumatoid arthritis)     dx age 16    Trauma     arm fractures        Past Surgical History:   Procedure Laterality Date    BREAST AUGMENTATION      CERVIX LESION DESTRUCTION      COLON RESECTION      D & C WITH SUCTION      HAND SURGERY      for RA    REPLACEMENT TOTAL KNEE Right     for RA                        PT Assessment/Plan       Row Name 25 1000          PT Assessment    Assessment Comments Ms. Pompa returns to PT for first f/u for back pain since evaluation, reporting poor compliance to HEP since being on vacation. She was able to amb 10k steps each day on vacation which brought on expected soreness, but no true exacerbation of symptoms. She does have inc soreness upon arrival from stress from external sources. Warmed up on nustep for dynamic UE/LE movement,  followed with review of LTR and piriformis str initiated at eval. Progressed with supine 90/90 hamstring str with DF, HL TrA, HL PPT, bridges, and clamshells to address strength and flexibility impairments. No c/o pain, just ongoing soreness. Ended visit with STM to L posterolateral hip girdle to address tightness, which pt reports no immediate adverse response. Encouraged improved adherence to HEP to gain optimal benefits to PT. She v/u. Pt remains appropriate for skilled PT at this time.  -DR        PT Plan    PT Plan Comments assess pt tolerance to first full tx, update HEP if tolerated well. consider adding  -DR               User Key  (r) = Recorded By, (t) = Taken By, (c) = Cosigned By      Initials Name Provider Type    Hiro Chanel, PT Physical Therapist                       OP Exercises       Row Name 07/30/25 0900             Subjective    Subjective Comments Im sore all over today just from increased stress. Even tender to touch my skin on my back.  -DR         Subjective Pain    Able to rate subjective pain? yes  -DR         Total Minutes    41618 - PT Therapeutic Exercise Minutes 35  -DR      95266 - PT Manual Therapy Minutes 12  -DR         Exercise 1    Exercise Name 1 nustep  -DR      Cueing 1 Verbal;Demo  -DR      Time 1 5 min  -DR         Exercise 2    Exercise Name 2 LTR  -DR      Cueing 2 Verbal;Demo  -DR      Reps 2 15  -DR      Time 2 5s  -DR         Exercise 3    Exercise Name 3 piriformis stretch  -DR      Cueing 3 Verbal;Demo  -DR      Reps 3 3  -DR      Time 3 20s  -DR         Exercise 4    Exercise Name 4 HL TA contraction  -DR      Cueing 4 Verbal;Demo  -DR      Reps 4 15  -DR      Time 4 5s  -DR         Exercise 5    Exercise Name 5 hamstring 9090 with dorsi flexion  -DR      Cueing 5 Verbal;Demo  -DR      Sets 5 2e  -DR      Reps 5 10  -DR         Exercise 6    Exercise Name 6 HL PPT  -DR      Cueing 6 Verbal;Demo  -DR      Sets 6 2  -DR      Reps 6 10  -DR      Time 6 5s  -DR          Exercise 7    Exercise Name 7 PPT + bridge  -DR      Cueing 7 Verbal;Demo  -DR      Sets 7 2  -DR      Reps 7 10  -DR         Exercise 8    Exercise Name 8 SL clamshells  -DR      Cueing 8 Verbal;Demo  -DR      Sets 8 2e  -DR      Reps 8 10  -DR      Time 8 RTB  -DR                User Key  (r) = Recorded By, (t) = Taken By, (c) = Cosigned By      Initials Name Provider Type    Hiro Chanel, PT Physical Therapist                             Manual Rx (Last 36 Hours)       Manual Treatments       Row Name 07/30/25 0900             Total Minutes    09675 - PT Manual Therapy Minutes 12  -DR         Manual Rx 1    Manual Rx 1 Location STM to L posterior/lateral hip girdle tissue  -DR      Manual Rx 1 Type foam roll  -DR                User Key  (r) = Recorded By, (t) = Taken By, (c) = Cosigned By      Initials Name Provider Type    Hiro Chanel, PT Physical Therapist                     PT OP Goals       Row Name 07/30/25 0900          PT Short Term Goals    STG Date to Achieve 08/16/25  -DR     STG 1 pt. to be I with initial HEP to facilitate self management of their condition  -DR     STG 1 Progress Ongoing  -DR     STG 2 pt. to be educated in/verbalize understanding of the importance of posture/ergonomics in association with their condition to facilitate self management of their condition  -DR     STG 2 Progress Ongoing  -DR     STG 3 Patient report left-sided low back pain to be intermittent versus constant to facilitate ease of performing household and community activities  -DR     STG 3 Progress Ongoing  -DR        Long Term Goals    LTG Date to Achieve 10/15/25  -DR     LTG 1 pt. to be I with advanced HEP to facilitate self management of their condition  -DR     LTG 1 Progress Ongoing  -DR     LTG 2 pt. to report an LEFS >/=  70% to demonstrate decreased level of perceived disability  -DR     LTG 2 Progress Ongoing  -DR     LTG 3 Patient did not demonstrate appropriate ergonomics with lifting  to facilitate ease of performance of household and community activities  -DR VILLEDA 3 Progress Ongoing  -     LTG 4 Report greater than equal to 25% improvement in her left-sided low back pain facilitate ease of performing household and recreational activities  -DR VILLEDA 4 Progress Ongoing  -               User Key  (r) = Recorded By, (t) = Taken By, (c) = Cosigned By      Initials Name Provider Type    Hiro Chanel, PT Physical Therapist                    Therapy Education  Education Details: updated HEP, discussed improved compliance to HEP to ensure appropriate benefit of PT  Given: HEP, Symptoms/condition management, Pain management, Posture/body mechanics, Mobility training, Edema management  Program: Reinforced, Progressed, New  How Provided: Verbal, Demonstration, Written  Provided to: Patient  Level of Understanding: Teach back education performed, Verbalized, Demonstrated              Time Calculation:   Start Time: 0947  Stop Time: 1034  Time Calculation (min): 47 min  Timed Charges  69163 - PT Therapeutic Exercise Minutes: 35  46111 - PT Manual Therapy Minutes: 12  Total Minutes  Timed Charges Total Minutes: 47   Total Minutes: 47  Therapy Charges for Today       Code Description Service Date Service Provider Modifiers Qty    38324912627 HC PT THER PROC EA 15 MIN 7/30/2025 Hiro Delcid, PT GP 2    98931133149 HC PT MANUAL THERAPY EA 15 MIN 7/30/2025 Hiro Delcid, PT GP 1                      Hiro Delcid PT  7/30/2025

## 2025-08-05 ENCOUNTER — TRANSCRIBE ORDERS (OUTPATIENT)
Dept: PHYSICAL THERAPY | Facility: HOSPITAL | Age: 60
End: 2025-08-05
Payer: MEDICARE

## 2025-08-05 DIAGNOSIS — M47.816 LUMBAR SPONDYLOSIS: ICD-10-CM

## 2025-08-05 DIAGNOSIS — M54.16 LUMBAR RADICULOPATHY: Primary | ICD-10-CM

## 2025-08-06 ENCOUNTER — HOSPITAL ENCOUNTER (OUTPATIENT)
Dept: PHYSICAL THERAPY | Facility: HOSPITAL | Age: 60
Setting detail: THERAPIES SERIES
Discharge: HOME | End: 2025-08-06
Payer: MEDICARE

## 2025-08-06 DIAGNOSIS — M54.50 LOW BACK PAIN, UNSPECIFIED BACK PAIN LATERALITY, UNSPECIFIED CHRONICITY, UNSPECIFIED WHETHER SCIATICA PRESENT: Primary | ICD-10-CM

## 2025-08-06 DIAGNOSIS — Z74.09 IMPAIRED MOBILITY: ICD-10-CM

## 2025-08-06 PROCEDURE — 97110 THERAPEUTIC EXERCISES: CPT

## 2025-08-06 PROCEDURE — 97535 SELF CARE MNGMENT TRAINING: CPT
